# Patient Record
Sex: FEMALE | Race: WHITE
[De-identification: names, ages, dates, MRNs, and addresses within clinical notes are randomized per-mention and may not be internally consistent; named-entity substitution may affect disease eponyms.]

---

## 2018-08-18 ENCOUNTER — HOSPITAL ENCOUNTER (EMERGENCY)
Dept: HOSPITAL 76 - ED | Age: 58
Discharge: HOME | End: 2018-08-18
Payer: MEDICARE

## 2018-08-18 VITALS — DIASTOLIC BLOOD PRESSURE: 80 MMHG | SYSTOLIC BLOOD PRESSURE: 130 MMHG

## 2018-08-18 DIAGNOSIS — S39.012A: ICD-10-CM
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
## 2018-08-18 DIAGNOSIS — I10: Primary | ICD-10-CM
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
## 2018-08-18 LAB
ALBUMIN DIAFP-MCNC: 4.3 G/DL (ref 3.2–5.5)
ALBUMIN/GLOB SERPL: 1.3 {RATIO} (ref 1–2.2)
ALP SERPL-CCNC: 109 IU/L (ref 42–121)
ALT SERPL W P-5'-P-CCNC: 12 IU/L (ref 10–60)
ANION GAP SERPL CALCULATED.4IONS-SCNC: 11 MMOL/L (ref 6–13)
AST SERPL W P-5'-P-CCNC: 20 IU/L (ref 10–42)
BASOPHILS NFR BLD AUTO: 0 10^3/UL (ref 0–0.1)
BASOPHILS NFR BLD AUTO: 0.5 %
BILIRUB BLD-MCNC: 0.6 MG/DL (ref 0.2–1)
BUN SERPL-MCNC: 14 MG/DL (ref 6–20)
CALCIUM UR-MCNC: 9.1 MG/DL (ref 8.5–10.3)
CHLORIDE SERPL-SCNC: 104 MMOL/L (ref 101–111)
CLARITY UR REFRACT.AUTO: CLEAR
CO2 SERPL-SCNC: 25 MMOL/L (ref 21–32)
CREAT SERPLBLD-SCNC: 0.7 MG/DL (ref 0.4–1)
EOSINOPHIL # BLD AUTO: 0 10^3/UL (ref 0–0.7)
EOSINOPHIL NFR BLD AUTO: 0.4 %
ERYTHROCYTE [DISTWIDTH] IN BLOOD BY AUTOMATED COUNT: 13.8 % (ref 12–15)
GFRSERPLBLD MDRD-ARVRAT: 86 ML/MIN/{1.73_M2} (ref 89–?)
GLOBULIN SER-MCNC: 3.4 G/DL (ref 2.1–4.2)
GLUCOSE SERPL-MCNC: 108 MG/DL (ref 70–100)
GLUCOSE UR QL STRIP.AUTO: NEGATIVE MG/DL
HGB UR QL STRIP: 13.6 G/DL (ref 12–16)
KETONES UR QL STRIP.AUTO: NEGATIVE MG/DL
LIPASE SERPL-CCNC: 34 U/L (ref 22–51)
LYMPHOCYTES # SPEC AUTO: 4.2 10^3/UL (ref 1.5–3.5)
LYMPHOCYTES NFR BLD AUTO: 45.4 %
MCH RBC QN AUTO: 30.9 PG (ref 27–31)
MCHC RBC AUTO-ENTMCNC: 33.8 G/DL (ref 32–36)
MCV RBC AUTO: 91.3 FL (ref 81–99)
MONOCYTES # BLD AUTO: 0.5 10^3/UL (ref 0–1)
MONOCYTES NFR BLD AUTO: 4.9 %
NEUTROPHILS # BLD AUTO: 4.5 10^3/UL (ref 1.5–6.6)
NEUTROPHILS # SNV AUTO: 9.3 X10^3/UL (ref 4.8–10.8)
NEUTROPHILS NFR BLD AUTO: 48.8 %
NITRITE UR QL STRIP.AUTO: NEGATIVE
PDW BLD AUTO: 7.5 FL (ref 7.9–10.8)
PH UR STRIP.AUTO: 5.5 PH (ref 5–7.5)
PLATELET # BLD: 266 10^3/UL (ref 130–450)
PROT SPEC-MCNC: 7.7 G/DL (ref 6.7–8.2)
PROT UR STRIP.AUTO-MCNC: NEGATIVE MG/DL
RBC # UR STRIP.AUTO: NEGATIVE /UL
RBC MAR: 4.42 10^6/UL (ref 4.2–5.4)
SODIUM SERPLBLD-SCNC: 140 MMOL/L (ref 135–145)
SP GR UR STRIP.AUTO: <=1.005 (ref 1–1.03)
UROBILINOGEN UR QL STRIP.AUTO: (no result) E.U./DL
UROBILINOGEN UR STRIP.AUTO-MCNC: NEGATIVE MG/DL

## 2018-08-18 PROCEDURE — 36415 COLL VENOUS BLD VENIPUNCTURE: CPT

## 2018-08-18 PROCEDURE — 85025 COMPLETE CBC W/AUTO DIFF WBC: CPT

## 2018-08-18 PROCEDURE — 81001 URINALYSIS AUTO W/SCOPE: CPT

## 2018-08-18 PROCEDURE — 81003 URINALYSIS AUTO W/O SCOPE: CPT

## 2018-08-18 PROCEDURE — 99283 EMERGENCY DEPT VISIT LOW MDM: CPT

## 2018-08-18 PROCEDURE — 96372 THER/PROPH/DIAG INJ SC/IM: CPT

## 2018-08-18 PROCEDURE — 80053 COMPREHEN METABOLIC PANEL: CPT

## 2018-08-18 PROCEDURE — 87086 URINE CULTURE/COLONY COUNT: CPT

## 2018-08-18 PROCEDURE — 83690 ASSAY OF LIPASE: CPT

## 2018-08-18 NOTE — ED PHYSICIAN DOCUMENTATION
History of Present Illness





- Stated complaint


Stated Complaint: FEMALE /BACK PX





- Chief complaint


Chief Complaint: Abd Pain





- History obtained from


History obtained from: Patient





- History of Present Illness


Timing: How many weeks ago (1)





- Additonal information


Additional information: 





Patient is a 58 year old female with a history of fibromyalgia and chronic 

fatigue who is presenting to the emergency department for right site sided back 

pain.  Patient states that she has been moving out of a storage unit and has 

been doing a lot of heavy lifting.  Patient states that she tried to drink to 

make the pain go away but it persisted.  





Review of Systems


Ten Systems: 10 systems reviewed and negative


Constitutional: denies: Fever, Chills


GI: denies: Nausea, Vomiting, Constipation, Diarrhea


: denies: Dysuria, Frequency, Hesitancy, Hematuria





PD PAST MEDICAL HISTORY





- Past Medical History


Past Medical History: Yes


Cardiovascular: Hypertension


Respiratory: None


GI: GERD


: None


HEENT: None


Musculoskeletal: Fibromyalgia, Chronic back pain





- Past Surgical History


Past Surgical History: Yes


Ortho: Carpal Tunnel surgery, Spine surgery


HEENT: Tonsil/Adenoidectomy





- Present Medications


Home Medications: 


 Ambulatory Orders











 Medication  Instructions  Recorded  Confirmed


 


No Known Home Medications [No  08/18/18 08/18/18





Known Home Medications]   














- Allergies


Allergies/Adverse Reactions: 


 Allergies











Allergy/AdvReac Type Severity Reaction Status Date / Time


 


terbutaline Allergy Unknown Rash Verified 08/18/18 18:47


 


Sulfa (Sulfonamide Allergy  Nausea Verified 08/18/18 18:47





Antibiotics)     


 


codeine AdvReac Unknown Nausea Verified 08/18/18 18:47


 


morphine AdvReac Unknown Rash Verified 08/18/18 18:47


 


Penicillins AdvReac Unknown Hives Verified 08/18/18 18:47














- Social History


Does the pt smoke?: No


Smoking Status: Never smoker


Does the pt drink ETOH?: No


Does the pt have substance abuse?: No





- Immunizations


Immunizations are current?: Yes


Immunizations: No immun





- POLST


Patient has POLST: No





PD ED PE NORMAL





- Vitals


Vital signs reviewed: Yes





- General


General: Alert and oriented X 3





- HEENT


HEENT: Atraumatic





- Cardiac


Cardiac: RRR





- Respiratory


Respiratory: No respiratory distress





- Abdomen


Abdomen: Soft, Non tender, Non distended





- Derm


Derm: Normal color, No rash





- Extremities


Extremities: No deformity





- Neuro


Neuro: Alert and oriented X 3


Eye Opening: Spontaneous





Results





- Vitals


Vitals: 


 Vital Signs - 24 hr











  08/18/18 08/18/18





  18:45 20:32


 


Temperature 36.8 C 


 


Heart Rate 79 74


 


Respiratory 16 16





Rate  


 


Blood Pressure 104/83 H 133/101 H


 


O2 Saturation 97 100








 Oxygen











O2 Source                      Room air

















- Labs


Labs: 


 Laboratory Tests











  08/18/18 08/18/18 08/18/18





  19:00 19:00 20:15


 


WBC  9.3  


 


RBC  4.42  


 


Hgb  13.6  


 


Hct  40.3  


 


MCV  91.3  


 


MCH  30.9  


 


MCHC  33.8  


 


RDW  13.8  


 


Plt Count  266  


 


MPV  7.5 L  


 


Neut # (Auto)  4.5  


 


Lymph # (Auto)  4.2 H  


 


Mono # (Auto)  0.5  


 


Eos # (Auto)  0.0  


 


Baso # (Auto)  0.0  


 


Absolute Nucleated RBC  0.01  


 


Nucleated RBC %  0.2  


 


Sodium   140 


 


Potassium   3.8 


 


Chloride   104 


 


Carbon Dioxide   25 


 


Anion Gap   11.0 


 


BUN   14 


 


Creatinine   0.7 


 


Estimated GFR (MDRD)   86 L 


 


Glucose   108 H 


 


Calcium   9.1 


 


Total Bilirubin   0.6 


 


AST   20 


 


ALT   12 


 


Alkaline Phosphatase   109 


 


Total Protein   7.7 


 


Albumin   4.3 


 


Globulin   3.4 


 


Albumin/Globulin Ratio   1.3 


 


Lipase   34 


 


Urine Color    LT. YELLOW


 


Urine Clarity    CLEAR


 


Urine pH    5.5


 


Ur Specific Gravity    <=1.005


 


Urine Protein    NEGATIVE


 


Urine Glucose (UA)    NEGATIVE


 


Urine Ketones    NEGATIVE


 


Urine Occult Blood    NEGATIVE


 


Urine Nitrite    NEGATIVE


 


Urine Bilirubin    NEGATIVE


 


Urine Urobilinogen    0.2 (NORMAL)


 


Ur Leukocyte Esterase    NEGATIVE


 


Ur Microscopic Review    NOT INDICATED


 


Urine Culture Comments    NOT INDICATED














PD MEDICAL DECISION MAKING





- ED course


Complexity details: reviewed old records, reviewed results, re-evaluated patient

, d/w patient


ED course: 





Patient was seen and examined at bedside.  labs were drawn and urine was 

collected.  ptient was treated with toradol, decadron and flexeril.  Upon re-

evaluation patient states that she was feeling better and wanted to go.  

patient required no further work up and was stable for discharge with 

outpatient follow up.  





- Sepsis Event


Vital Signs: 


 Vital Signs - 24 hr











  08/18/18 08/18/18





  18:45 20:32


 


Temperature 36.8 C 


 


Heart Rate 79 74


 


Respiratory 16 16





Rate  


 


Blood Pressure 104/83 H 133/101 H


 


O2 Saturation 97 100








 Oxygen











O2 Source                      Room air

















Departure





- Departure


Disposition: 01 Home, Self Care


Clinical Impression: 


 Low back strain





Condition: Good


Instructions:  ED Spasm Back No Trauma


Follow-Up: 


primary,care provider [Other] - Within 3 Days


Comments: 


Your diagnostics today were within normal limits.  there are no acute 

abnormalities.  You likely strained your back.  you can alternate between 

ibuprofen and tylenol as needed for pain.  You can apply ice or heat as 

necessary.  You should follow up with your doctor for re-evaluation if your 

symptoms persist.  You may return to the emergency deaprtment at any time for 

new, worsening or uncontrollable symptoms.